# Patient Record
Sex: FEMALE | Race: WHITE | NOT HISPANIC OR LATINO | URBAN - METROPOLITAN AREA
[De-identification: names, ages, dates, MRNs, and addresses within clinical notes are randomized per-mention and may not be internally consistent; named-entity substitution may affect disease eponyms.]

---
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
## 2025-01-28 ENCOUNTER — NON-APPOINTMENT (OUTPATIENT)
Age: 20
End: 2025-01-28

## 2025-02-26 ENCOUNTER — NON-APPOINTMENT (OUTPATIENT)
Age: 20
End: 2025-02-26

## 2025-07-10 NOTE — ED PEDIATRIC TRIAGE NOTE - PAIN RATING/NUMBER SCALE (0-10): ACTIVITY
10
07-10    136  |  96  |  49[H]  ----------------------------<  149[H]  4.3   |  23  |  5.72[H]    Ca    9.3      10 Jul 2025 07:04    POCT Blood Glucose.: 241 mg/dL (07-10-25 @ 13:25)  A1C with Estimated Average Glucose Result: 6.3 % (07-03-25 @ 09:54)  A1C with Estimated Average Glucose Result: 6.4 % (03-17-25 @ 17:05)  A1C with Estimated Average Glucose Result: 6.7 % (02-05-25 @ 06:14)